# Patient Record
Sex: FEMALE | Race: OTHER
[De-identification: names, ages, dates, MRNs, and addresses within clinical notes are randomized per-mention and may not be internally consistent; named-entity substitution may affect disease eponyms.]

---

## 2019-09-25 ENCOUNTER — HOSPITAL ENCOUNTER (EMERGENCY)
Dept: HOSPITAL 74 - JERFT | Age: 69
Discharge: HOME | End: 2019-09-25
Payer: COMMERCIAL

## 2019-09-25 VITALS — DIASTOLIC BLOOD PRESSURE: 96 MMHG | TEMPERATURE: 97.3 F | HEART RATE: 78 BPM | SYSTOLIC BLOOD PRESSURE: 163 MMHG

## 2019-09-25 VITALS — BODY MASS INDEX: 36.6 KG/M2

## 2019-09-25 DIAGNOSIS — S42.202A: Primary | ICD-10-CM

## 2019-09-25 DIAGNOSIS — I10: ICD-10-CM

## 2019-09-25 DIAGNOSIS — C71.9: ICD-10-CM
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
## 2019-09-25 DIAGNOSIS — Z91.013: ICD-10-CM
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
## 2019-09-25 DIAGNOSIS — E11.9: ICD-10-CM

## 2019-09-25 NOTE — PDOC
Rapid Medical Evaluation


Time Seen by Provider: 09/25/19 13:10


Medical Evaluation: 





09/25/19 13:11


HPI: L shoulder pain after a fall at home did not hit head 





PE: No gross deficits Moves finger 





ORDERS: x-ray L shoulder





**Discharge Disposition





- Diagnosis


 Injury of left shoulder








- Referrals





- Patient Instructions





- Post Discharge Activity

## 2019-09-25 NOTE — PDOC
History of Present Illness





- General


Chief Complaint: Injury


Stated Complaint: LT. ARM PAIN/ FALL


Time Seen by Provider: 09/25/19 13:10


History Source: Patient


Exam Limitations: No Limitations





Past History





- Travel


Traveled outside of the country in the last 30 days: No


Close contact w/someone who was outside of country & ill: No





- Past Medical History


Allergies/Adverse Reactions: 


 Allergies











Allergy/AdvReac Type Severity Reaction Status Date / Time


 


shellfish derived Allergy   Verified 09/25/19 13:14











Home Medications: 


Ambulatory Orders





Docusate Sodium [Colace -] 100 mg PO BID #14 capsule 09/25/19 


Ondansetron [Zofran Odt -] 4 mg SL TID #10 od.tablet 09/25/19 


Oxycodone HCl/Acetaminophen [Percocet 5-325 mg Tablet] 1 tab PO Q6H #12 tablet 

MDD 4 09/25/19 








COPD: No


Diabetes: Yes


HTN: Yes


Other medical history: BRAIN TUMOR





- Psycho Social/Smoking Cessation Hx


Smoking History: Never smoked


Hx Alcohol Use: No


Drug/Substance Use Hx: No





**Review of Systems





- Review of Systems


Able to Perform ROS?: Yes


Comments:: 





09/25/19 16:53


CONSTITUTIONAL: 


Absent: fever, chills, diaphoresis, generalized weakness, malaise, loss of 

appetite


MUSCULOSKELETAL: 


Present: L shoulder pain Absent: myalgia,  joint swelling


SKIN: 


Absent: rash, itching, pallor


HEMATOLOGIC/IMMUNOLOGIC: 


Absent: easy bleeding, easy bruising, lymphadenopathy, frequent infections


ENDOCRINE:


Absent: unexplained weight gain, unexplained weight loss, heat intolerance, 

cold intolerance


NEUROLOGIC: 


Absent: headache, focal weakness or paresthesias, dizziness, unsteady gait, 

seizure, mental status changes, bladder or bowel incontinence


PSYCHIATRIC: 


Absent: anxiety, depression, suicidal or homicidal ideation, hallucinations.





Is the patient limited English proficient: No





*Physical Exam





- Vital Signs


 Last Vital Signs











Temp Pulse Resp BP Pulse Ox


 


 97.3 F L  78   16   163/96   96 


 


 09/25/19 13:11  09/25/19 13:11  09/25/19 13:11  09/25/19 13:11  09/25/19 13:11














- Physical Exam


Comments: 





09/26/19 16:57


GENERAL: The patient is awake, alert, and fully oriented, in no acute distress.


HEAD: Normal with no signs of trauma.


EYES: Pupils equal, round and reactive to light, extraocular movements intact, 

sclera anicteric, conjunctiva clear.


EXTREMITIES: patient guarding left shoulder, unable to move it at this time.  2

+ distal pulses radial pulse bilaterally.  Patient able to move fingers.  PMS 

grossly intact in the left arm. Normal range of motion at all other joints, no 

edema.


NEUROLOGICAL: Normal speech, normal gait.


PSYCH: Normal mood, normal affect.


SKIN: Warm, Dry, normal turgor, no rashes or lesions noted.





Medical Decision Making





- Medical Decision Making





09/25/19 16:58


The patient is a 68-year-old female with past medical history presents to the 

ER status post mechanical trip and fall today.  She states she tripped bathroom 

and landed on her left shoulder.  Denies hitting her head or losing 

consciousness.  She states that the fall happened at 11 AM.  She notes that she 

is unable to move her left arm due to the pain at this time.  Patient is right-

hand dominant.  Denies fevers, chills, lightheadedness, weakness, loss of 

consciousness, head trauma, numbness and tingling to the affected extremity.





A/P: Proximal humerus fracture


On exam patient guarding left shoulder, unable to move it at this time.  2+ 

distal pulses radial pulse bilaterally.  Patient able to move fingers.  PMS 

grossly intact in the left arm. 


X-ray shows a completely fractured proximal humerus with the head of the 

humerus completely displaced from the shaft of the humerus.  The ball of the 

humerus sitting within the glenoid space.


Consulted with Dr. Ignacio.  Recommend putting patient in sling, treating the 

pain and will have the patient follow-up in the office on Friday for further 

evaluation and management.


Patient is neurologically intact, PMS intact.


Discharge home.  Patient explained that she is follow-up with orthopedics and 

that this most likely needs surgery to fix.


I discussed the physical exam findings, ancillary test results and final 

diagnoses with the patient. I answered all of the patient's questions. The 

patient was satisfied with the care received and felt comfortable with the 

discharge plan and treatment plan.  The Patient agrees to follow up with the 

primary care physician/specialist within 24-72 hours. Return precautions were 

given.





Discharge





- Discharge Information


Problems reviewed: Yes


Clinical Impression/Diagnosis: 


Proximal humerus fracture


Qualifiers:


 Encounter type: initial encounter Fracture type: closed Fracture morphology: 

unspecified fracture morphology Laterality: left Qualified Code(s): S42.202A - 

Unspecified fracture of upper end of left humerus, initial encounter for closed 

fracture





Condition: Stable


Disposition: HOME





- Admission


No





- Additional Discharge Information


Prescriptions: 


Docusate Sodium [Colace -] 100 mg PO BID #14 capsule


Ondansetron [Zofran Odt -] 4 mg SL TID #10 od.tablet


Oxycodone HCl/Acetaminophen [Percocet 5-325 mg Tablet] 1 tab PO Q6H #12 tablet 

MDD 4





- Follow up/Referral


Referrals: 


Ashish Ignacio MD [Staff Physician] - 





- Patient Discharge Instructions


Patient Printed Discharge Instructions:  How to Use a Sling


Additional Instructions: 


You broke your proximal humerus (shoulder)


Wear the sling until you see orthopedics


Call Dr. Ignacio today to make an appointment for Friday


Take the percocet every 6hours as needed for pain. Do not drink or drive after 

taking the medication


Take the zofran every 8 hours as needed for nausea


Take the Colace as directed to prevent constipation (the percocet can make you 

constipated)





Return to the ER for any new or worsening symptoms





Se rompi el hmero proximal (hombro)


Use la honda hasta que umm ortopedia


Llame al Dr. Ignacio hoy para hacer mega felix para el viernes.


[facility] el percocet cada 6 horas segn sea necesario para el dolor. No tome ni 

maneje despus de magi el medicamento.


[facility] el zofran cada 8 horas segn sea necesario para las nuseas.


[facility] el Colace segn las indicaciones para prevenir el estreimiento (el 

percocet puede provocarle estreimiento)





Regrese a la palak de emergencias por cualquier sntoma nuevo o que empeore


Print Language: Malian





- Post Discharge Activity

## 2024-12-14 ENCOUNTER — HOSPITAL ENCOUNTER (EMERGENCY)
Dept: HOSPITAL 74 - JER | Age: 74
Discharge: HOME | End: 2024-12-14
Payer: COMMERCIAL

## 2024-12-14 VITALS
RESPIRATION RATE: 18 BRPM | TEMPERATURE: 98.2 F | SYSTOLIC BLOOD PRESSURE: 103 MMHG | DIASTOLIC BLOOD PRESSURE: 80 MMHG | HEART RATE: 97 BPM

## 2024-12-14 VITALS — BODY MASS INDEX: 30.8 KG/M2

## 2024-12-14 DIAGNOSIS — E11.65: Primary | ICD-10-CM

## 2024-12-14 DIAGNOSIS — R11.0: ICD-10-CM

## 2024-12-14 LAB
ALBUMIN SERPL-MCNC: 3.4 G/DL (ref 3.4–5)
ALP SERPL-CCNC: 110 U/L (ref 45–117)
ALT SERPL-CCNC: 14 U/L (ref 13–61)
ANION GAP SERPL CALC-SCNC: 7 MMOL/L (ref 4–13)
APPEARANCE UR: CLEAR
AST SERPL-CCNC: 18 U/L (ref 15–37)
BASE EXCESS BLDV CALC-SCNC: 3.2 MMOL/L (ref -2–2)
BASOPHILS # BLD: 0.7 % (ref 0–2)
BILIRUB SERPL-MCNC: 0.3 MG/DL (ref 0.2–1)
BILIRUB UR STRIP.AUTO-MCNC: NEGATIVE MG/DL
BUN SERPL-MCNC: 18 MG/DL (ref 7–18)
CALCIUM SERPL-MCNC: 9.4 MG/DL (ref 8.5–10.1)
CHLORIDE SERPL-SCNC: 92 MMOL/L (ref 98–107)
CO2 SERPL-SCNC: 31 MMOL/L (ref 21–32)
COLOR UR: YELLOW
CREAT SERPL-MCNC: 1 MG/DL (ref 0.55–1.3)
DEPRECATED RDW RBC AUTO: 15.1 % (ref 11.6–15.6)
EOSINOPHIL # BLD: 1.2 % (ref 0–4.5)
GLUCOSE SERPL-MCNC: 506 MG/DL (ref 74–106)
HCT VFR BLD CALC: 40.4 % (ref 32.4–45.2)
HCT VFR BLDV CALC: 42 % (ref 32.4–45.2)
HGB BLD-MCNC: 13 GM/DL (ref 10.7–15.3)
KETONES UR QL STRIP: NEGATIVE
LEUKOCYTE ESTERASE UR QL STRIP.AUTO: NEGATIVE
LYMPHOCYTES # BLD: 25.1 % (ref 8–40)
MAGNESIUM SERPL-MCNC: 2 MG/DL (ref 1.8–2.4)
MCH RBC QN AUTO: 29.9 PG (ref 25.7–33.7)
MCHC RBC AUTO-ENTMCNC: 32.2 G/DL (ref 32–36)
MCV RBC: 92.9 FL (ref 80–96)
MONOCYTES # BLD AUTO: 6.2 % (ref 3.8–10.2)
NEUTROPHILS # BLD: 66.8 % (ref 42.8–82.8)
NITRITE UR QL STRIP: NEGATIVE
PCO2 BLDV: 56.7 MMHG (ref 38–52)
PH BLDV: 7.35 [PH] (ref 7.31–7.41)
PH UR: 5.5 [PH] (ref 5–8)
PHOSPHATE SERPL-MCNC: 3.2 MG/DL (ref 2.5–4.9)
PLATELET # BLD AUTO: 247 10^3/UL (ref 134–434)
PMV BLD: 9.4 FL (ref 7.5–11.1)
POTASSIUM SERPLBLD-SCNC: 4.3 MMOL/L (ref 3.5–5.1)
PROT SERPL-MCNC: 7.8 G/DL (ref 6.4–8.2)
PROT UR QL STRIP: (no result)
PROT UR QL STRIP: NEGATIVE
RBC # BLD AUTO: 4.35 M/MM3 (ref 3.6–5.2)
SAO2 % BLDV: 45.8 % (ref 70–80)
SODIUM SERPL-SCNC: 131 MMOL/L (ref 136–145)
SP GR UR: 1.02 (ref 1.01–1.03)
UROBILINOGEN UR STRIP-MCNC: 0.2 MG/DL (ref 0.2–1)
WBC # BLD AUTO: 8.3 K/MM3 (ref 4–10)

## 2024-12-14 RX ADMIN — HUMAN INSULIN ONE UNITS: 100 INJECTION, SOLUTION SUBCUTANEOUS at 17:05

## 2024-12-14 RX ADMIN — SODIUM CHLORIDE, POTASSIUM CHLORIDE, SODIUM LACTATE AND CALCIUM CHLORIDE ONE ML: 600; 310; 30; 20 INJECTION, SOLUTION INTRAVENOUS at 16:18
